# Patient Record
(demographics unavailable — no encounter records)

---

## 2024-11-01 NOTE — HISTORY OF PRESENT ILLNESS
[de-identified] : 40 year F  presents with bleeding/bruising   Patient  has epistaxis. Lasts more than 10 minutes. Patient has had  surgical intervention  x2.  Patient  has bruises. Has had 5 or more bruises. Has had hematoma after trauma. Patient denies bleeding from minor wounds. Denies more than 5 per year. Denies intervention. Patient denies oral cavity bleeding with brushing/flossing/dental procedure. Denies intervention or packing or medication. Patient   denies GI bleeding. Denies  ulcer, portal hypertension, hemorrhoids, angiodysplasia.  Patient   denies Hematuria Patient  denies bleeding from Tooth extraction Patient  has bleeding from Surgery 2013 balloon sinusplasty 2024 Hysteroscopy for polypectomy and myomectomy and D& C. Received medication, used stitches.  Patient  denies bleeding from Menorrhagia Patient  denies Post-partum hemorrhage Patient   denies Muscle hematomas Patient   denies Hemarthrosis Patient  denies CNS bleeding   Denies  turmeric use, fish oil, or blood thinners. Takes NSAIDs 4 times a month Takes paroxetine, had bruising/bleeding prior to starting at age 16   Family history Mother and grandmother cannot take aspirin, have bleeding Worse periods when exercising Has bruising from heavy lifting Has small blood spots, pinpoint  Has had low iron in the past, given oral iron slow fe. Ferritin was 11 and changed to 20. Still has fatigue.  Gets restless legs  Has autoimmune progesterone dermatitis. Hives during luteal phase of cycle. 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   8/2/2024 takes iburpofen for rmenstural cramps has friboids on vaginal wall brusies easiy  [de-identified] : 8/30/2024 Patient presents today for follow up for easy bruising and RITA.  Patient has epistaxis as a child - had cauterization twice. No further episodes now.  Patient has menses currently and bleeding was very heavy during the end, and it lasted longer. Completed 5 IV iron infusions and felt better after the infusions. However, now with heavy menses patient is feeling fatigue again.  Had celecoxib had 3 doses, has heavy menses, tried tylenol  11/1/2024 Has low iron  Has had menorrhagia Has been taking tranexamic acid Taking celebrex, has abdominal upset

## 2024-11-01 NOTE — REASON FOR VISIT
[FreeTextEntry2] : 40-year-old with a history of submucosal fibroid and endometrial polyp status post hysteroscopy, myomectomy, polypectomy and D&C. Referred to Hematology for c/o bruising easily

## 2024-11-01 NOTE — REVIEW OF SYSTEMS
[Fatigue] : fatigue [Shortness Of Breath] : shortness of breath [Diarrhea: Grade 0] : Diarrhea: Grade 0 [Negative] : Allergic/Immunologic [Chest Pain] : no chest pain [Abdominal Pain] : no abdominal pain [Skin Rash] : no skin rash [Confused] : no confusion

## 2024-11-01 NOTE — HISTORY OF PRESENT ILLNESS
[de-identified] : 40 year F  presents with bleeding/bruising   Patient  has epistaxis. Lasts more than 10 minutes. Patient has had  surgical intervention  x2.  Patient  has bruises. Has had 5 or more bruises. Has had hematoma after trauma. Patient denies bleeding from minor wounds. Denies more than 5 per year. Denies intervention. Patient denies oral cavity bleeding with brushing/flossing/dental procedure. Denies intervention or packing or medication. Patient   denies GI bleeding. Denies  ulcer, portal hypertension, hemorrhoids, angiodysplasia.  Patient   denies Hematuria Patient  denies bleeding from Tooth extraction Patient  has bleeding from Surgery 2013 balloon sinusplasty 2024 Hysteroscopy for polypectomy and myomectomy and D& C. Received medication, used stitches.  Patient  denies bleeding from Menorrhagia Patient  denies Post-partum hemorrhage Patient   denies Muscle hematomas Patient   denies Hemarthrosis Patient  denies CNS bleeding   Denies  turmeric use, fish oil, or blood thinners. Takes NSAIDs 4 times a month Takes paroxetine, had bruising/bleeding prior to starting at age 16   Family history Mother and grandmother cannot take aspirin, have bleeding Worse periods when exercising Has bruising from heavy lifting Has small blood spots, pinpoint  Has had low iron in the past, given oral iron slow fe. Ferritin was 11 and changed to 20. Still has fatigue.  Gets restless legs  Has autoimmune progesterone dermatitis. Hives during luteal phase of cycle. 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   8/2/2024 takes iburpofen for rmenstural cramps has friboids on vaginal wall brusies easiy  [de-identified] : 8/30/2024 Patient presents today for follow up for easy bruising and RITA.  Patient has epistaxis as a child - had cauterization twice. No further episodes now.  Patient has menses currently and bleeding was very heavy during the end, and it lasted longer. Completed 5 IV iron infusions and felt better after the infusions. However, now with heavy menses patient is feeling fatigue again.  Had celecoxib had 3 doses, has heavy menses, tried tylenol  11/1/2024 Has low iron  Has had menorrhagia Has been taking tranexamic acid Taking celebrex, has abdominal upset

## 2024-11-01 NOTE — ASSESSMENT
[FreeTextEntry1] :     #Bleeding disorder of unknown cause -patient has had epistaxis, hematomas, and surgical bleeding from hysteroscopy -has high ISTH bleeding score of 9 -normal workup for hemostasis -normal von Willebrand antigen/activity on day 2 of menses; normal mixing study and factor XI assay taking celecoxib for menstrual cramps ---->will do tranexamic acid for menstrual cramps will  need platelet aggregation test   #GYN surgery -no hematologic objection tranexamic acid IV 1300mg  1 hour before and IV 1300mg after 1 hour , and then every 1300mg PO 8 hours for excessive bleeding postop Hold NSAIDS and celecoxib 7 days prior bleeding disorder of unknown cause -pending factor VIII level    #Iron deficiency anemia secondary to blood loss vs malabsorption vs decreased PO intake Discussed physiology of iron and erythropoiesis. Discussed iron tablet vs liquid vs intravenous s/p IV iron venofer x5 doses Will repeat iron tests in 3 months(Nov 2024)   Labs next appointment: CBC, iron, B12, ferritin, retic

## 2025-01-28 NOTE — HISTORY OF PRESENT ILLNESS
[de-identified] : 40 year F  presents with bleeding/bruising   Patient  has epistaxis. Lasts more than 10 minutes. Patient has had  surgical intervention  x2.  Patient  has bruises. Has had 5 or more bruises. Has had hematoma after trauma. Patient denies bleeding from minor wounds. Denies more than 5 per year. Denies intervention. Patient denies oral cavity bleeding with brushing/flossing/dental procedure. Denies intervention or packing or medication. Patient   denies GI bleeding. Denies  ulcer, portal hypertension, hemorrhoids, angiodysplasia.  Patient   denies Hematuria Patient  denies bleeding from Tooth extraction Patient  has bleeding from Surgery 2013 balloon sinusplasty 2024 Hysteroscopy for polypectomy and myomectomy and D& C. Received medication, used stitches.  Patient  denies bleeding from Menorrhagia Patient  denies Post-partum hemorrhage Patient   denies Muscle hematomas Patient   denies Hemarthrosis Patient  denies CNS bleeding   Denies  turmeric use, fish oil, or blood thinners. Takes NSAIDs 4 times a month Takes paroxetine, had bruising/bleeding prior to starting at age 16   Family history Mother and grandmother cannot take aspirin, have bleeding Worse periods when exercising Has bruising from heavy lifting Has small blood spots, pinpoint  Has had low iron in the past, given oral iron slow fe. Ferritin was 11 and changed to 20. Still has fatigue.  Gets restless legs  Has autoimmune progesterone dermatitis. Hives during luteal phase of cycle. 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   8/2/2024 takes iburpofen for rmenstural cramps has friboids on vaginal wall brusies easiy  [de-identified] : 8/30/2024 Patient presents today for follow up for easy bruising and RITA.  Patient has epistaxis as a child - had cauterization twice. No further episodes now.  Patient has menses currently and bleeding was very heavy during the end, and it lasted longer. Completed 5 IV iron infusions and felt better after the infusions. However, now with heavy menses patient is feeling fatigue again.  Had celecoxib had 3 doses, has heavy menses, tried tylenol  11/1/2024 Has low iron  Has had menorrhagia Has been taking tranexamic acid Taking celebrex, has abdominal upset

## 2025-01-28 NOTE — ASSESSMENT
[FreeTextEntry1] :     #Bleeding disorder of unknown cause -patient has had epistaxis, hematomas, and surgical bleeding from hysteroscopy -has high ISTH bleeding score of 9 -normal workup for hemostasis -normal von Willebrand antigen/activity on day 2 of menses; normal mixing study and factor XI assay taking celecoxib for menstrual cramps ---->will do tranexamic acid for menstrual cramps normal platelet aggregation test   #GYN surgery -no hematologic objection tranexamic acid IV 1300mg  1 hour before and IV 1300mg after 1 hour , and then every 1300mg PO 8 hours for excessive bleeding postop Hold NSAIDS and celecoxib 7 days prior bleeding disorder of unknown cause     #Iron deficiency anemia secondary to blood loss vs malabsorption vs decreased PO intake Discussed physiology of iron and erythropoiesis. Discussed iron tablet vs liquid vs intravenous s/p IV iron venofer x5 doses Will repeat iron tests today, ferritin pending, if ferritin<50 will do venofer   Labs next appointment: CBC, iron, B12, ferritin, retic

## 2025-04-25 NOTE — HISTORY OF PRESENT ILLNESS
[de-identified] : 40 year F  presents with bleeding/bruising   Patient  has epistaxis. Lasts more than 10 minutes. Patient has had  surgical intervention  x2.  Patient  has bruises. Has had 5 or more bruises. Has had hematoma after trauma. Patient denies bleeding from minor wounds. Denies more than 5 per year. Denies intervention. Patient denies oral cavity bleeding with brushing/flossing/dental procedure. Denies intervention or packing or medication. Patient   denies GI bleeding. Denies  ulcer, portal hypertension, hemorrhoids, angiodysplasia.  Patient   denies Hematuria Patient  denies bleeding from Tooth extraction Patient  has bleeding from Surgery 2013 balloon sinusplasty 2024 Hysteroscopy for polypectomy and myomectomy and D& C. Received medication, used stitches.  Patient  denies bleeding from Menorrhagia Patient  denies Post-partum hemorrhage Patient   denies Muscle hematomas Patient   denies Hemarthrosis Patient  denies CNS bleeding   Denies  turmeric use, fish oil, or blood thinners. Takes NSAIDs 4 times a month Takes paroxetine, had bruising/bleeding prior to starting at age 16   Family history Mother and grandmother cannot take aspirin, have bleeding Worse periods when exercising Has bruising from heavy lifting Has small blood spots, pinpoint  Has had low iron in the past, given oral iron slow fe. Ferritin was 11 and changed to 20. Still has fatigue.  Gets restless legs  Has autoimmune progesterone dermatitis. Hives during luteal phase of cycle. 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   Fam Hx - M grandfather with colon cancer ( Dx age 60's)   8/2/2024 takes iburpofen for rmenstural cramps has friboids on vaginal wall brusies easiy  [de-identified] :  8/30/2024 Patient presents today for follow up for easy bruising and RITA.  Patient has epistaxis as a child - had cauterization twice. No further episodes now.  Patient has menses currently and bleeding was very heavy during the end, and it lasted longer. Completed 5 IV iron infusions and felt better after the infusions. However, now with heavy menses patient is feeling fatigue again.  Had celecoxib had 3 doses, has heavy menses, tried tylenol   11/1/2024 Has low iron  Has had menorrhagia Has been taking tranexamic acid Taking celebrex, has abdominal upset  04/24/2025 - here for follow up post iron infusions : venofer 200 mg IV 4/5 infusions. Last one feb 28th, 2025  - Not on any iron pills. Only a MVI once a day.  - Had a Hysterectomy March 8th, 2025. Minimal bleeding. Had TXA before and after . Denies bruising.   - Denies Fever, chills, unint wt loss/ wt gain, dizziness, headaches, chest pain, SOB, Chronic cough, reflux, heartburn, dysphagia, early satiety, food avoidance, abd pain, bloating, cramping, N/V/C/D, BRBPR, black stool,.

## 2025-04-25 NOTE — HISTORY OF PRESENT ILLNESS
[de-identified] : 40 year F  presents with bleeding/bruising   Patient  has epistaxis. Lasts more than 10 minutes. Patient has had  surgical intervention  x2.  Patient  has bruises. Has had 5 or more bruises. Has had hematoma after trauma. Patient denies bleeding from minor wounds. Denies more than 5 per year. Denies intervention. Patient denies oral cavity bleeding with brushing/flossing/dental procedure. Denies intervention or packing or medication. Patient   denies GI bleeding. Denies  ulcer, portal hypertension, hemorrhoids, angiodysplasia.  Patient   denies Hematuria Patient  denies bleeding from Tooth extraction Patient  has bleeding from Surgery 2013 balloon sinusplasty 2024 Hysteroscopy for polypectomy and myomectomy and D& C. Received medication, used stitches.  Patient  denies bleeding from Menorrhagia Patient  denies Post-partum hemorrhage Patient   denies Muscle hematomas Patient   denies Hemarthrosis Patient  denies CNS bleeding   Denies  turmeric use, fish oil, or blood thinners. Takes NSAIDs 4 times a month Takes paroxetine, had bruising/bleeding prior to starting at age 16   Family history Mother and grandmother cannot take aspirin, have bleeding Worse periods when exercising Has bruising from heavy lifting Has small blood spots, pinpoint  Has had low iron in the past, given oral iron slow fe. Ferritin was 11 and changed to 20. Still has fatigue.  Gets restless legs  Has autoimmune progesterone dermatitis. Hives during luteal phase of cycle. 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   Fam Hx - M grandfather with colon cancer ( Dx age 60's)   8/2/2024 takes iburpofen for rmenstural cramps has friboids on vaginal wall brusies easiy  [de-identified] :  8/30/2024 Patient presents today for follow up for easy bruising and RITA.  Patient has epistaxis as a child - had cauterization twice. No further episodes now.  Patient has menses currently and bleeding was very heavy during the end, and it lasted longer. Completed 5 IV iron infusions and felt better after the infusions. However, now with heavy menses patient is feeling fatigue again.  Had celecoxib had 3 doses, has heavy menses, tried tylenol   11/1/2024 Has low iron  Has had menorrhagia Has been taking tranexamic acid Taking celebrex, has abdominal upset  04/24/2025 - here for follow up post iron infusions : venofer 200 mg IV 4/5 infusions. Last one feb 28th, 2025  - Not on any iron pills. Only a MVI once a day.  - Had a Hysterectomy March 8th, 2025. Minimal bleeding. Had TXA before and after . Denies bruising.   - Denies Fever, chills, unint wt loss/ wt gain, dizziness, headaches, chest pain, SOB, Chronic cough, reflux, heartburn, dysphagia, early satiety, food avoidance, abd pain, bloating, cramping, N/V/C/D, BRBPR, black stool,.

## 2025-04-25 NOTE — ASSESSMENT
[FreeTextEntry1] :     #Bleeding disorder of unknown cause -patient has had epistaxis, hematomas, and surgical bleeding from hysteroscopy -has high ISTH bleeding score of 9 -normal workup for hemostasis -normal von Willebrand antigen/activity on day 2 of menses; normal mixing study and factor XI assay taking celecoxib for menstrual cramps ---->will do tranexamic acid for menstrual cramps normal platelet aggregation test   #GYN surgery -no hematologic objection tranexamic acid IV 1300mg  1 hour before and IV 1300mg after 1 hour , and then every 1300mg PO 8 hours for excessive bleeding postop Hold NSAIDS and celecoxib 7 days prior bleeding disorder of unknown cause     #Iron deficiency anemia secondary to blood loss vs malabsorption vs decreased PO intake Discussed physiology of iron and erythropoiesis. Discussed iron tablet vs liquid vs intravenous s/p IV iron venofer x5 doses Will repeat iron tests today, ferritin pending, if ferritin<50 will do venofer April, 2025 - No anemia noted-- hgb; 13.8, hct; 41.8% pending iron panel  - If ferritin below 50 consider further iron infusions IV - Further txt pending lab findings     OV in 3 months  Labs next appointment: CBC, iron, B12, ferritin, retic

## 2025-04-25 NOTE — HISTORY OF PRESENT ILLNESS
[de-identified] : 40 year F  presents with bleeding/bruising   Patient  has epistaxis. Lasts more than 10 minutes. Patient has had  surgical intervention  x2.  Patient  has bruises. Has had 5 or more bruises. Has had hematoma after trauma. Patient denies bleeding from minor wounds. Denies more than 5 per year. Denies intervention. Patient denies oral cavity bleeding with brushing/flossing/dental procedure. Denies intervention or packing or medication. Patient   denies GI bleeding. Denies  ulcer, portal hypertension, hemorrhoids, angiodysplasia.  Patient   denies Hematuria Patient  denies bleeding from Tooth extraction Patient  has bleeding from Surgery 2013 balloon sinusplasty 2024 Hysteroscopy for polypectomy and myomectomy and D& C. Received medication, used stitches.  Patient  denies bleeding from Menorrhagia Patient  denies Post-partum hemorrhage Patient   denies Muscle hematomas Patient   denies Hemarthrosis Patient  denies CNS bleeding   Denies  turmeric use, fish oil, or blood thinners. Takes NSAIDs 4 times a month Takes paroxetine, had bruising/bleeding prior to starting at age 16   Family history Mother and grandmother cannot take aspirin, have bleeding Worse periods when exercising Has bruising from heavy lifting Has small blood spots, pinpoint  Has had low iron in the past, given oral iron slow fe. Ferritin was 11 and changed to 20. Still has fatigue.  Gets restless legs  Has autoimmune progesterone dermatitis. Hives during luteal phase of cycle. 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   Fam Hx - M grandfather with colon cancer ( Dx age 60's)   8/2/2024 takes iburpofen for rmenstural cramps has friboids on vaginal wall brusies easiy  [de-identified] :  8/30/2024 Patient presents today for follow up for easy bruising and RITA.  Patient has epistaxis as a child - had cauterization twice. No further episodes now.  Patient has menses currently and bleeding was very heavy during the end, and it lasted longer. Completed 5 IV iron infusions and felt better after the infusions. However, now with heavy menses patient is feeling fatigue again.  Had celecoxib had 3 doses, has heavy menses, tried tylenol   11/1/2024 Has low iron  Has had menorrhagia Has been taking tranexamic acid Taking celebrex, has abdominal upset  04/24/2025 - here for follow up post iron infusions : venofer 200 mg IV 4/5 infusions. Last one feb 28th, 2025  - Not on any iron pills. Only a MVI once a day.  - Had a Hysterectomy March 8th, 2025. Minimal bleeding. Had TXA before and after . Denies bruising.   - Denies Fever, chills, unint wt loss/ wt gain, dizziness, headaches, chest pain, SOB, Chronic cough, reflux, heartburn, dysphagia, early satiety, food avoidance, abd pain, bloating, cramping, N/V/C/D, BRBPR, black stool,.

## 2025-07-17 NOTE — ASSESSMENT
[FreeTextEntry1] :     #Bleeding disorder of unknown cause -patient has had epistaxis, hematomas, and surgical bleeding from hysteroscopy -has high ISTH bleeding score of 9 -normal workup for hemostasis -normal von Willebrand antigen/activity on day 2 of menses; normal mixing study and factor XI assay taking celecoxib for menstrual cramps ---->will do tranexamic acid for menstrual cramps normal platelet aggregation test   #GYN surgery -no hematologic objection tranexamic acid IV 1300mg  1 hour before and IV 1300mg after 1 hour , and then every 1300mg PO 8 hours for excessive bleeding postop Hold NSAIDS and celecoxib 7 days prior bleeding disorder of unknown cause     #Iron deficiency anemia secondary to blood loss vs malabsorption vs decreased PO intake Discussed physiology of iron and erythropoiesis. Discussed iron tablet vs liquid vs intravenous s/p IV iron venofer x5 doses Will repeat iron tests today, ferritin pending, if ferritin<50 will do venofer April, 2025 - No anemia noted-- hgb; 13.8, hct; 41.8% pending iron panel  - If ferritin below 50 consider further iron infusions IV - Further txt pending lab findings  7/11/2025 normal CBC, f/u ferritin; if low, will do IV iron and consider FOBT    OV in 3 months  Labs next appointment: CBC, iron, B12, ferritin, retic

## 2025-07-17 NOTE — HISTORY OF PRESENT ILLNESS
[de-identified] : 40 year F  presents with bleeding/bruising   Patient  has epistaxis. Lasts more than 10 minutes. Patient has had  surgical intervention  x2.  Patient  has bruises. Has had 5 or more bruises. Has had hematoma after trauma. Patient denies bleeding from minor wounds. Denies more than 5 per year. Denies intervention. Patient denies oral cavity bleeding with brushing/flossing/dental procedure. Denies intervention or packing or medication. Patient   denies GI bleeding. Denies  ulcer, portal hypertension, hemorrhoids, angiodysplasia.  Patient   denies Hematuria Patient  denies bleeding from Tooth extraction Patient  has bleeding from Surgery 2013 balloon sinusplasty 2024 Hysteroscopy for polypectomy and myomectomy and D& C. Received medication, used stitches.  Patient  denies bleeding from Menorrhagia Patient  denies Post-partum hemorrhage Patient   denies Muscle hematomas Patient   denies Hemarthrosis Patient  denies CNS bleeding   Denies  turmeric use, fish oil, or blood thinners. Takes NSAIDs 4 times a month Takes paroxetine, had bruising/bleeding prior to starting at age 16   Family history Mother and grandmother cannot take aspirin, have bleeding Worse periods when exercising Has bruising from heavy lifting Has small blood spots, pinpoint  Has had low iron in the past, given oral iron slow fe. Ferritin was 11 and changed to 20. Still has fatigue.  Gets restless legs  Has autoimmune progesterone dermatitis. Hives during luteal phase of cycle. 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   Fam Hx - M grandfather with colon cancer ( Dx age 60's)   8/2/2024 takes iburpofen for rmenstural cramps has friboids on vaginal wall brusies easiy  [de-identified] : 8/30/2024 Patient presents today for follow up for easy bruising and RITA.  Patient has epistaxis as a child - had cauterization twice. No further episodes now.  Patient has menses currently and bleeding was very heavy during the end, and it lasted longer. Completed 5 IV iron infusions and felt better after the infusions. However, now with heavy menses patient is feeling fatigue again.  Had celecoxib had 3 doses, has heavy menses, tried tylenol  11/1/2024 Has low iron  Has had menorrhagia Has been taking tranexamic acid Taking celebrex, has abdominal upset  04/24/2025 - here for follow up post iron infusions : venofer 200 mg IV 4/5 infusions. Last one feb 28th, 2025  - Not on any iron pills. Only a MVI once a day.  - Had a Hysterectomy March 8th, 2025. Minimal bleeding. Had TXA before and after . Denies bruising.   - Denies Fever, chills, unint wt loss/ wt gain, dizziness, headaches, chest pain, SOB, Chronic cough, reflux, heartburn, dysphagia, early satiety, food avoidance, abd pain, bloating, cramping, N/V/C/D, BRBPR, black stool,.    7/11/25 S/p hysterectomy in March. No longer needing TXA. No longer on oral iron. No further vaginal bleeding since surgery. Started on oral estradiol. Does report minor fatigue since surgery. Denies any SOB, lightheadedness, dizziness or blood in stools.

## 2025-07-17 NOTE — HISTORY OF PRESENT ILLNESS
[de-identified] : 40 year F  presents with bleeding/bruising   Patient  has epistaxis. Lasts more than 10 minutes. Patient has had  surgical intervention  x2.  Patient  has bruises. Has had 5 or more bruises. Has had hematoma after trauma. Patient denies bleeding from minor wounds. Denies more than 5 per year. Denies intervention. Patient denies oral cavity bleeding with brushing/flossing/dental procedure. Denies intervention or packing or medication. Patient   denies GI bleeding. Denies  ulcer, portal hypertension, hemorrhoids, angiodysplasia.  Patient   denies Hematuria Patient  denies bleeding from Tooth extraction Patient  has bleeding from Surgery 2013 balloon sinusplasty 2024 Hysteroscopy for polypectomy and myomectomy and D& C. Received medication, used stitches.  Patient  denies bleeding from Menorrhagia Patient  denies Post-partum hemorrhage Patient   denies Muscle hematomas Patient   denies Hemarthrosis Patient  denies CNS bleeding   Denies  turmeric use, fish oil, or blood thinners. Takes NSAIDs 4 times a month Takes paroxetine, had bruising/bleeding prior to starting at age 16   Family history Mother and grandmother cannot take aspirin, have bleeding Worse periods when exercising Has bruising from heavy lifting Has small blood spots, pinpoint  Has had low iron in the past, given oral iron slow fe. Ferritin was 11 and changed to 20. Still has fatigue.  Gets restless legs  Has autoimmune progesterone dermatitis. Hives during luteal phase of cycle. 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   Fam Hx - M grandfather with colon cancer ( Dx age 60's)   8/2/2024 takes iburpofen for rmenstural cramps has friboids on vaginal wall brusies easiy  [de-identified] : 8/30/2024 Patient presents today for follow up for easy bruising and RITA.  Patient has epistaxis as a child - had cauterization twice. No further episodes now.  Patient has menses currently and bleeding was very heavy during the end, and it lasted longer. Completed 5 IV iron infusions and felt better after the infusions. However, now with heavy menses patient is feeling fatigue again.  Had celecoxib had 3 doses, has heavy menses, tried tylenol  11/1/2024 Has low iron  Has had menorrhagia Has been taking tranexamic acid Taking celebrex, has abdominal upset  04/24/2025 - here for follow up post iron infusions : venofer 200 mg IV 4/5 infusions. Last one feb 28th, 2025  - Not on any iron pills. Only a MVI once a day.  - Had a Hysterectomy March 8th, 2025. Minimal bleeding. Had TXA before and after . Denies bruising.   - Denies Fever, chills, unint wt loss/ wt gain, dizziness, headaches, chest pain, SOB, Chronic cough, reflux, heartburn, dysphagia, early satiety, food avoidance, abd pain, bloating, cramping, N/V/C/D, BRBPR, black stool,.    7/11/25 S/p hysterectomy in March. No longer needing TXA. No longer on oral iron. No further vaginal bleeding since surgery. Started on oral estradiol. Does report minor fatigue since surgery. Denies any SOB, lightheadedness, dizziness or blood in stools.

## 2025-07-17 NOTE — HISTORY OF PRESENT ILLNESS
[de-identified] : 40 year F  presents with bleeding/bruising   Patient  has epistaxis. Lasts more than 10 minutes. Patient has had  surgical intervention  x2.  Patient  has bruises. Has had 5 or more bruises. Has had hematoma after trauma. Patient denies bleeding from minor wounds. Denies more than 5 per year. Denies intervention. Patient denies oral cavity bleeding with brushing/flossing/dental procedure. Denies intervention or packing or medication. Patient   denies GI bleeding. Denies  ulcer, portal hypertension, hemorrhoids, angiodysplasia.  Patient   denies Hematuria Patient  denies bleeding from Tooth extraction Patient  has bleeding from Surgery 2013 balloon sinusplasty 2024 Hysteroscopy for polypectomy and myomectomy and D& C. Received medication, used stitches.  Patient  denies bleeding from Menorrhagia Patient  denies Post-partum hemorrhage Patient   denies Muscle hematomas Patient   denies Hemarthrosis Patient  denies CNS bleeding   Denies  turmeric use, fish oil, or blood thinners. Takes NSAIDs 4 times a month Takes paroxetine, had bruising/bleeding prior to starting at age 16   Family history Mother and grandmother cannot take aspirin, have bleeding Worse periods when exercising Has bruising from heavy lifting Has small blood spots, pinpoint  Has had low iron in the past, given oral iron slow fe. Ferritin was 11 and changed to 20. Still has fatigue.  Gets restless legs  Has autoimmune progesterone dermatitis. Hives during luteal phase of cycle. 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   Fam Hx - M grandfather with colon cancer ( Dx age 60's)   8/2/2024 takes iburpofen for rmenstural cramps has friboids on vaginal wall brusies easiy  [de-identified] : 8/30/2024 Patient presents today for follow up for easy bruising and RITA.  Patient has epistaxis as a child - had cauterization twice. No further episodes now.  Patient has menses currently and bleeding was very heavy during the end, and it lasted longer. Completed 5 IV iron infusions and felt better after the infusions. However, now with heavy menses patient is feeling fatigue again.  Had celecoxib had 3 doses, has heavy menses, tried tylenol  11/1/2024 Has low iron  Has had menorrhagia Has been taking tranexamic acid Taking celebrex, has abdominal upset  04/24/2025 - here for follow up post iron infusions : venofer 200 mg IV 4/5 infusions. Last one feb 28th, 2025  - Not on any iron pills. Only a MVI once a day.  - Had a Hysterectomy March 8th, 2025. Minimal bleeding. Had TXA before and after . Denies bruising.   - Denies Fever, chills, unint wt loss/ wt gain, dizziness, headaches, chest pain, SOB, Chronic cough, reflux, heartburn, dysphagia, early satiety, food avoidance, abd pain, bloating, cramping, N/V/C/D, BRBPR, black stool,.    7/11/25 S/p hysterectomy in March. No longer needing TXA. No longer on oral iron. No further vaginal bleeding since surgery. Started on oral estradiol. Does report minor fatigue since surgery. Denies any SOB, lightheadedness, dizziness or blood in stools.

## 2025-07-17 NOTE — REVIEW OF SYSTEMS
[Fatigue] : fatigue [Diarrhea: Grade 0] : Diarrhea: Grade 0 [Negative] : Allergic/Immunologic [Patient Intake Form Reviewed] : Patient intake form was reviewed [Chest Pain] : no chest pain [Shortness Of Breath] : no shortness of breath [Abdominal Pain] : no abdominal pain [Skin Rash] : no skin rash [Confused] : no confusion [Dizziness] : no dizziness [FreeTextEntry2] : minor fatigue